# Patient Record
Sex: FEMALE | Race: OTHER | NOT HISPANIC OR LATINO | Employment: FULL TIME | URBAN - METROPOLITAN AREA
[De-identification: names, ages, dates, MRNs, and addresses within clinical notes are randomized per-mention and may not be internally consistent; named-entity substitution may affect disease eponyms.]

---

## 2019-11-11 ENCOUNTER — APPOINTMENT (EMERGENCY)
Dept: CT IMAGING | Facility: HOSPITAL | Age: 44
End: 2019-11-11
Payer: COMMERCIAL

## 2019-11-11 ENCOUNTER — HOSPITAL ENCOUNTER (EMERGENCY)
Facility: HOSPITAL | Age: 44
Discharge: HOME | End: 2019-11-11
Attending: EMERGENCY MEDICINE | Admitting: EMERGENCY MEDICINE
Payer: COMMERCIAL

## 2019-11-11 VITALS
SYSTOLIC BLOOD PRESSURE: 170 MMHG | OXYGEN SATURATION: 99 % | TEMPERATURE: 97.6 F | WEIGHT: 244.93 LBS | RESPIRATION RATE: 20 BRPM | DIASTOLIC BLOOD PRESSURE: 102 MMHG | HEART RATE: 84 BPM

## 2019-11-11 DIAGNOSIS — J34.1 MUCOUS RETENTION CYST OF MAXILLARY SINUS: ICD-10-CM

## 2019-11-11 DIAGNOSIS — R11.0 NAUSEA: Primary | ICD-10-CM

## 2019-11-11 DIAGNOSIS — R51.9 HEADACHE: ICD-10-CM

## 2019-11-11 DIAGNOSIS — R42 LIGHTHEADED: ICD-10-CM

## 2019-11-11 DIAGNOSIS — N89.8 VAGINAL DISCHARGE: ICD-10-CM

## 2019-11-11 LAB
ALBUMIN SERPL BCP-MCNC: 3.7 G/DL (ref 3.5–5)
ALP SERPL-CCNC: 57 U/L (ref 46–116)
ALT SERPL W P-5'-P-CCNC: 46 U/L (ref 12–78)
ANION GAP SERPL CALCULATED.3IONS-SCNC: 9 MMOL/L (ref 4–13)
AST SERPL W P-5'-P-CCNC: 21 U/L (ref 5–45)
BACTERIA UR QL AUTO: ABNORMAL /HPF
BASOPHILS # BLD AUTO: 0.06 THOUSANDS/ΜL (ref 0–0.1)
BASOPHILS NFR BLD AUTO: 1 % (ref 0–1)
BILIRUB SERPL-MCNC: 0.5 MG/DL (ref 0.2–1)
BILIRUB UR QL STRIP: NEGATIVE
BUN SERPL-MCNC: 13 MG/DL (ref 5–25)
CALCIUM SERPL-MCNC: 8.8 MG/DL (ref 8.3–10.1)
CHLORIDE SERPL-SCNC: 105 MMOL/L (ref 100–108)
CLARITY UR: CLEAR
CLARITY, POC: CLEAR
CO2 SERPL-SCNC: 26 MMOL/L (ref 21–32)
COLOR UR: YELLOW
COLOR, POC: YELLOW
CREAT SERPL-MCNC: 1.03 MG/DL (ref 0.6–1.3)
EOSINOPHIL # BLD AUTO: 0.16 THOUSAND/ΜL (ref 0–0.61)
EOSINOPHIL NFR BLD AUTO: 2 % (ref 0–6)
ERYTHROCYTE [DISTWIDTH] IN BLOOD BY AUTOMATED COUNT: 14.7 % (ref 11.6–15.1)
EXT BILIRUBIN, UA: NEGATIVE
EXT BLOOD URINE: ABNORMAL
EXT GLUCOSE, UA: NEGATIVE
EXT KETONES: NEGATIVE
EXT NITRITE, UA: NEGATIVE
EXT PH, UA: 6
EXT PREG TEST URINE: NEGATIVE
EXT PROTEIN, UA: ABNORMAL
EXT SPECIFIC GRAVITY, UA: 1.03
EXT UROBILINOGEN: 0.2
EXT. CONTROL ED NAV: NORMAL
GFR SERPL CREATININE-BSD FRML MDRD: 67 ML/MIN/1.73SQ M
GLUCOSE SERPL-MCNC: 92 MG/DL (ref 65–140)
GLUCOSE UR STRIP-MCNC: NEGATIVE MG/DL
HCT VFR BLD AUTO: 41.1 % (ref 34.8–46.1)
HGB BLD-MCNC: 12.9 G/DL (ref 11.5–15.4)
HGB UR QL STRIP.AUTO: ABNORMAL
HIV 1+2 AB+HIV1 P24 AG SERPL QL IA: NORMAL
HIV1 P24 AG SER QL: NORMAL
IMM GRANULOCYTES # BLD AUTO: 0.02 THOUSAND/UL (ref 0–0.2)
IMM GRANULOCYTES NFR BLD AUTO: 0 % (ref 0–2)
KETONES UR STRIP-MCNC: NEGATIVE MG/DL
LEUKOCYTE ESTERASE UR QL STRIP: NEGATIVE
LYMPHOCYTES # BLD AUTO: 4.5 THOUSANDS/ΜL (ref 0.6–4.47)
LYMPHOCYTES NFR BLD AUTO: 53 % (ref 14–44)
MCH RBC QN AUTO: 28 PG (ref 26.8–34.3)
MCHC RBC AUTO-ENTMCNC: 31.4 G/DL (ref 31.4–37.4)
MCV RBC AUTO: 89 FL (ref 82–98)
MONOCYTES # BLD AUTO: 0.87 THOUSAND/ΜL (ref 0.17–1.22)
MONOCYTES NFR BLD AUTO: 10 % (ref 4–12)
NEUTROPHILS # BLD AUTO: 2.87 THOUSANDS/ΜL (ref 1.85–7.62)
NEUTS SEG NFR BLD AUTO: 34 % (ref 43–75)
NITRITE UR QL STRIP: NEGATIVE
NON-SQ EPI CELLS URNS QL MICRO: ABNORMAL /HPF
NRBC BLD AUTO-RTO: 0 /100 WBCS
PH UR STRIP.AUTO: 5.5 [PH]
PLATELET # BLD AUTO: 279 THOUSANDS/UL (ref 149–390)
PMV BLD AUTO: 9.8 FL (ref 8.9–12.7)
POTASSIUM SERPL-SCNC: 3.4 MMOL/L (ref 3.5–5.3)
PROT SERPL-MCNC: 8.5 G/DL (ref 6.4–8.2)
PROT UR STRIP-MCNC: NEGATIVE MG/DL
RBC # BLD AUTO: 4.61 MILLION/UL (ref 3.81–5.12)
RBC #/AREA URNS AUTO: ABNORMAL /HPF
SODIUM SERPL-SCNC: 140 MMOL/L (ref 136–145)
SP GR UR STRIP.AUTO: >=1.03 (ref 1–1.03)
UROBILINOGEN UR QL STRIP.AUTO: 0.2 E.U./DL
WBC # BLD AUTO: 8.48 THOUSAND/UL (ref 4.31–10.16)
WBC # BLD EST: NEGATIVE 10*3/UL
WBC #/AREA URNS AUTO: ABNORMAL /HPF

## 2019-11-11 PROCEDURE — 86592 SYPHILIS TEST NON-TREP QUAL: CPT | Performed by: PHYSICIAN ASSISTANT

## 2019-11-11 PROCEDURE — 85025 COMPLETE CBC W/AUTO DIFF WBC: CPT | Performed by: PHYSICIAN ASSISTANT

## 2019-11-11 PROCEDURE — 81001 URINALYSIS AUTO W/SCOPE: CPT | Performed by: PHYSICIAN ASSISTANT

## 2019-11-11 PROCEDURE — 99284 EMERGENCY DEPT VISIT MOD MDM: CPT | Performed by: PHYSICIAN ASSISTANT

## 2019-11-11 PROCEDURE — 36415 COLL VENOUS BLD VENIPUNCTURE: CPT | Performed by: PHYSICIAN ASSISTANT

## 2019-11-11 PROCEDURE — 87806 HIV AG W/HIV1&2 ANTB W/OPTIC: CPT | Performed by: PHYSICIAN ASSISTANT

## 2019-11-11 PROCEDURE — 87070 CULTURE OTHR SPECIMN AEROBIC: CPT | Performed by: PHYSICIAN ASSISTANT

## 2019-11-11 PROCEDURE — 96372 THER/PROPH/DIAG INJ SC/IM: CPT

## 2019-11-11 PROCEDURE — 93005 ELECTROCARDIOGRAM TRACING: CPT

## 2019-11-11 PROCEDURE — 80053 COMPREHEN METABOLIC PANEL: CPT | Performed by: PHYSICIAN ASSISTANT

## 2019-11-11 PROCEDURE — 81025 URINE PREGNANCY TEST: CPT | Performed by: PHYSICIAN ASSISTANT

## 2019-11-11 PROCEDURE — 87591 N.GONORRHOEAE DNA AMP PROB: CPT | Performed by: PHYSICIAN ASSISTANT

## 2019-11-11 PROCEDURE — 87491 CHLMYD TRACH DNA AMP PROBE: CPT | Performed by: PHYSICIAN ASSISTANT

## 2019-11-11 PROCEDURE — 70450 CT HEAD/BRAIN W/O DYE: CPT

## 2019-11-11 PROCEDURE — 99284 EMERGENCY DEPT VISIT MOD MDM: CPT

## 2019-11-11 RX ORDER — LISINOPRIL 40 MG/1
40 TABLET ORAL DAILY
COMMUNITY

## 2019-11-11 RX ORDER — HYDROCHLOROTHIAZIDE 12.5 MG/1
10 CAPSULE, GELATIN COATED ORAL DAILY
COMMUNITY

## 2019-11-11 RX ORDER — METRONIDAZOLE 500 MG/1
500 TABLET ORAL EVERY 12 HOURS SCHEDULED
Qty: 14 TABLET | Refills: 0 | Status: SHIPPED | OUTPATIENT
Start: 2019-11-11 | End: 2019-11-18

## 2019-11-11 RX ORDER — AZITHROMYCIN 250 MG/1
1000 TABLET, FILM COATED ORAL ONCE
Status: COMPLETED | OUTPATIENT
Start: 2019-11-11 | End: 2019-11-11

## 2019-11-11 RX ORDER — METRONIDAZOLE 500 MG/1
500 TABLET ORAL ONCE
Status: COMPLETED | OUTPATIENT
Start: 2019-11-11 | End: 2019-11-11

## 2019-11-11 RX ADMIN — LIDOCAINE HYDROCHLORIDE 250 MG: 10 INJECTION, SOLUTION EPIDURAL; INFILTRATION; INTRACAUDAL; PERINEURAL at 19:35

## 2019-11-11 RX ADMIN — METRONIDAZOLE 500 MG: 500 TABLET ORAL at 19:37

## 2019-11-11 RX ADMIN — AZITHROMYCIN 1000 MG: 250 TABLET, FILM COATED ORAL at 19:37

## 2019-11-11 NOTE — ED PROVIDER NOTES
History  Chief Complaint   Patient presents with    Nausea     pt states she has been having nausea/headaches x 2 weeks, dizziness upon standing today  Pt  would also like to have STI screening and pregnancy test done (has been practicing unprotected sex x 2 weeks)     Patient is a 44 y/o F with h/o HTN that presents to the ED with a headache that started 2 weeks ago  SHe states the headache has been coming and going  She has not taken anything for the headache  She states she also has nausea, no vomiting  She denies sinus pressure, but has a little congestion which is normal for her  She states today she felt lightheaded  She denies dizziness, chest pain or SOB  She has been taking her BP meds as directed  She is concerned for STI  She states she has had unprotected sex with 2 different partners in the past 3 weeks  SHe denies abnormal vaginal discharge  She states she had Chlamydia when she was in college and her only symptom was nausea  History provided by:  Patient  Nausea   The primary symptoms include nausea  Primary symptoms do not include fever, abdominal pain, vomiting, diarrhea, dysuria or rash  The illness does not include chills or back pain     Headache   Pain location:  Generalized  Severity currently:  4/10  Onset quality:  Gradual  Duration:  2 weeks  Timing:  Intermittent  Progression:  Unchanged  Chronicity:  New  Similar to prior headaches: yes    Relieved by:  Nothing  Worsened by:  Nothing  Ineffective treatments:  NSAIDs  Associated symptoms: congestion and nausea    Associated symptoms: no abdominal pain, no back pain, no blurred vision, no cough, no diarrhea, no dizziness, no ear pain, no facial pain, no fever, no focal weakness, no loss of balance, no near-syncope, no neck pain, no numbness, no paresthesias, no photophobia, no seizures, no sinus pressure, no sore throat, no syncope, no tingling, no URI, no vomiting and no weakness        Prior to Admission Medications Prescriptions Last Dose Informant Patient Reported? Taking?   hydrochlorothiazide (MICROZIDE) 12 5 mg capsule   Yes Yes   Sig: Take 10 mg by mouth daily   lisinopril (ZESTRIL) 40 mg tablet   Yes Yes   Sig: Take 40 mg by mouth daily      Facility-Administered Medications: None       Past Medical History:   Diagnosis Date    Hypertension        Past Surgical History:   Procedure Laterality Date    TONSILLECTOMY         History reviewed  No pertinent family history  I have reviewed and agree with the history as documented  Social History     Tobacco Use    Smoking status: Never Smoker    Smokeless tobacco: Never Used   Substance Use Topics    Alcohol use: Yes    Drug use: Never        Review of Systems   Constitutional: Negative for chills and fever  HENT: Positive for congestion  Negative for ear pain, facial swelling, sinus pressure, sinus pain and sore throat  Eyes: Negative for blurred vision and photophobia  Respiratory: Negative for cough and shortness of breath  Cardiovascular: Negative for chest pain, leg swelling, syncope and near-syncope  Gastrointestinal: Positive for nausea  Negative for abdominal pain, diarrhea and vomiting  Genitourinary: Negative for dysuria, vaginal bleeding and vaginal discharge  Musculoskeletal: Negative for back pain and neck pain  Skin: Negative for color change and rash  Neurological: Positive for light-headedness and headaches  Negative for dizziness, focal weakness, seizures, syncope, facial asymmetry, speech difficulty, weakness, numbness, paresthesias and loss of balance  Psychiatric/Behavioral: Negative for confusion and decreased concentration  All other systems reviewed and are negative  Physical Exam  Physical Exam   Constitutional: She is oriented to person, place, and time  She appears well-developed and well-nourished  She is cooperative  She does not appear ill  No distress  HENT:   Head: Normocephalic and atraumatic     Right Ear: Hearing and tympanic membrane normal    Left Ear: Hearing and tympanic membrane normal    Nose: Nose normal    Mouth/Throat: Uvula is midline, oropharynx is clear and moist and mucous membranes are normal    Eyes: Pupils are equal, round, and reactive to light  Conjunctivae and EOM are normal    Neck: Normal range of motion  Neck supple  Cardiovascular: Normal rate, regular rhythm and normal heart sounds  No murmur heard  Pulmonary/Chest: Effort normal and breath sounds normal  She has no wheezes  She has no rhonchi  She has no rales  Abdominal: Soft  Normal appearance and bowel sounds are normal  There is no tenderness  Genitourinary: Pelvic exam was performed with patient supine  There is no rash or lesion on the right labia  There is no rash or lesion on the left labia  Cervix exhibits no motion tenderness and no friability  Right adnexum displays no mass  Left adnexum displays no mass  No erythema or bleeding in the vagina  No foreign body in the vagina  Vaginal discharge (thick white discharge) found  Musculoskeletal: Normal range of motion  She exhibits no edema, tenderness or deformity  Neurological: She is alert and oriented to person, place, and time  She has normal strength  No cranial nerve deficit or sensory deficit  Coordination and gait normal  GCS eye subscore is 4  GCS verbal subscore is 5  GCS motor subscore is 6  Skin: Skin is warm and dry  No rash noted  She is not diaphoretic  No pallor  Psychiatric: She has a normal mood and affect  Nursing note and vitals reviewed        Vital Signs  ED Triage Vitals [11/11/19 1722]   Temperature Pulse Respirations Blood Pressure SpO2   97 6 °F (36 4 °C) 95 18 (!) 181/106 96 %      Temp Source Heart Rate Source Patient Position - Orthostatic VS BP Location FiO2 (%)   Temporal Monitor Sitting Right arm --      Pain Score       No Pain           Vitals:    11/11/19 1845 11/11/19 1900 11/11/19 1926 11/11/19 1930   BP: 149/67 155/70 (!) 170/102 (!) 170/102   Pulse: 84 84 79 84   Patient Position - Orthostatic VS: Lying Lying Lying Lying         Visual Acuity      ED Medications  Medications   cefTRIAXone (ROCEPHIN) 250 mg in lidocaine (PF) (XYLOCAINE-MPF) 1 % IM only syringe (250 mg Intramuscular Given 11/11/19 1935)   azithromycin (ZITHROMAX) tablet 1,000 mg (1,000 mg Oral Given 11/11/19 1937)   metroNIDAZOLE (FLAGYL) tablet 500 mg (500 mg Oral Given 11/11/19 1937)       Diagnostic Studies  Results Reviewed     Procedure Component Value Units Date/Time    Genital Comprehensive Culture [[id number]] Collected:  11/11/19 1924    Lab Status:   In process Specimen:  Genital from Cervix Updated:  11/11/19 1948    Urine Microscopic [[id number]]  (Abnormal) Collected:  11/11/19 1802    Lab Status:  Final result Specimen:  Urine, Clean Catch Updated:  11/11/19 1906     RBC, UA 1-2 /hpf      WBC, UA None Seen /hpf      Epithelial Cells Moderate /hpf      Bacteria, UA None Seen /hpf     UA w Reflex to Microscopic w Reflex to Culture [[id number]]  (Abnormal) Collected:  11/11/19 1802    Lab Status:  Final result Specimen:  Urine, Clean Catch Updated:  11/11/19 1848     Color, UA Yellow     Clarity, UA Clear     Specific Gravity, UA >=1 030     pH, UA 5 5     Leukocytes, UA Negative     Nitrite, UA Negative     Protein, UA Negative mg/dl      Glucose, UA Negative mg/dl      Ketones, UA Negative mg/dl      Urobilinogen, UA 0 2 E U /dl      Bilirubin, UA Negative     Blood, UA Small    Comprehensive metabolic panel [[id number]]  (Abnormal) Collected:  11/11/19 1800    Lab Status:  Final result Specimen:  Blood from Arm, Right Updated:  11/11/19 1828     Sodium 140 mmol/L      Potassium 3 4 mmol/L      Chloride 105 mmol/L      CO2 26 mmol/L      ANION GAP 9 mmol/L      BUN 13 mg/dL      Creatinine 1 03 mg/dL      Glucose 92 mg/dL      Calcium 8 8 mg/dL      AST 21 U/L      ALT 46 U/L      Alkaline Phosphatase 57 U/L      Total Protein 8 5 g/dL      Albumin 3 7 g/dL Total Bilirubin 0 50 mg/dL      eGFR 67 ml/min/1 73sq m     Narrative:       [place] guidelines for Chronic Kidney Disease (CKD):     Stage 1 with normal or high GFR (GFR > 90 mL/min/1 73 square meters)    Stage 2 Mild CKD (GFR = 60-89 mL/min/1 73 square meters)    Stage 3A Moderate CKD (GFR = 45-59 mL/min/1 73 square meters)    Stage 3B Moderate CKD (GFR = 30-44 mL/min/1 73 square meters)    Stage 4 Severe CKD (GFR = 15-29 mL/min/1 73 square meters)    Stage 5 End Stage CKD (GFR <15 mL/min/1 73 square meters)  Note: GFR calculation is accurate only with a steady state creatinine    CBC and differential [[id number]]  (Abnormal) Collected:  11/11/19 1800    Lab Status:  Final result Specimen:  Blood from Arm, Right Updated:  11/11/19 1809     WBC 8 48 Thousand/uL      RBC 4 61 Million/uL      Hemoglobin 12 9 g/dL      Hematocrit 41 1 %      MCV 89 fL      MCH 28 0 pg      MCHC 31 4 g/dL      RDW 14 7 %      MPV 9 8 fL      Platelets 936 Thousands/uL      nRBC 0 /100 WBCs      Neutrophils Relative 34 %      Immat GRANS % 0 %      Lymphocytes Relative 53 %      Monocytes Relative 10 %      Eosinophils Relative 2 %      Basophils Relative 1 %      Neutrophils Absolute 2 87 Thousands/µL      Immature Grans Absolute 0 02 Thousand/uL      Lymphocytes Absolute 4 50 Thousands/µL      Monocytes Absolute 0 87 Thousand/µL      Eosinophils Absolute 0 16 Thousand/µL      Basophils Absolute 0 06 Thousands/µL     Rapid HIV 1/2 AB-AG Combo [[id number]] Collected:  11/11/19 1800    Lab Status: In process Specimen:  Blood from Arm, Right Updated:  11/11/19 1806    RPR [[id number]] Collected:  11/11/19 1800    Lab Status: In process Specimen:  Blood from Arm, Right Updated:  11/11/19 1806    Chlamydia/GC amplified DNA by PCR [[id number]] Collected:  11/11/19 1802    Lab Status:   In process Specimen:  Urine, Other Updated:  11/11/19 1806    POCT urinalysis dipstick [[id number]]  (Abnormal) Resulted: 11/11/19 1800    Lab Status:  Final result Specimen:  Urine Updated:  11/11/19 1801     Color, UA yellow     Clarity, UA clear     Glucose, UA (Ref: Negative) negative     Bilirubin, UA (Ref: Negative) negative     Ketones, UA (Ref: Negative) negative     Spec Grav, UA (Ref:1 003-1 030) 1 030     Blood, UA (Ref: Negative) moderate     pH, UA (Ref: 4 5-8 0) 6 0     Protein, UA (Ref: Negative) trace     Urobilinogen, UA (Ref: 0 2- 1 0) 0 2      Leukocytes, UA (Ref: Negative) negative     Nitrite, UA (Ref: Negative) negative    POCT pregnancy, urine [[id number]]  (Normal) Resulted:  11/11/19 1800    Lab Status:  Final result Updated:  11/11/19 1800     EXT PREG TEST UR (Ref: Negative) negative     Control valid                 CT head without contrast   Final Result by Genet King MD (11/11 1847)      No acute intracranial abnormality  Large right maxillary sinus retention cyst             Workstation performed: [id number]                    Procedures  ECG 12 Lead Documentation Only  Date/Time: 11/11/2019 6:49 PM  Performed by: Rishi Jaffe PA-C  Authorized by: Rishi Jaffe PA-C     Indications / Diagnosis:  Lightheaded  Patient location:  ED  Previous ECG:     Previous ECG:  Unavailable  Rate:     ECG rate:  83  Rhythm:     Rhythm: sinus rhythm    Conduction:     Conduction: normal    ST segments:     ST segments:  Normal  T waves:     T waves: normal             ED Course                               MDM  Number of Diagnoses or Management Options  Headache: new and requires workup  Lightheaded: new and requires workup  Mucous retention cyst of maxillary sinus: minor  Nausea: new and requires workup  Vaginal discharge: new and requires workup  Diagnosis management comments: Patient with multiple complaints, will order CT head to r/o brain hemorrhage or tumor  She has nausea and lightheadedness which she has had with prior STI, will order GC/Chlamydia    Patient also requesting testing for HIV   She did have abnormal vaginal discharge on exam, will treat in the ER, culture results pending  Amount and/or Complexity of Data Reviewed  Clinical lab tests: ordered and reviewed  Tests in the radiology section of CPT®: ordered and reviewed    Patient Progress  Patient progress: stable      Disposition  Final diagnoses:   Nausea   Headache   Mucous retention cyst of maxillary sinus   Lightheaded   Vaginal discharge     Time reflects when diagnosis was documented in both MDM as applicable and the Disposition within this note     Time User Action Codes Description Comment    11/11/2019  7:26 PM Loman Ling [R11 0] Nausea     11/11/2019  7:26 PM Redell Kerr Add [R51] Headache     11/11/2019  7:26 PM Redell Kerr Add [J34 1] Mucous retention cyst of maxillary sinus     11/11/2019  7:26 PM Redell Kerr Add [R42] Lightheaded     11/11/2019  7:27 PM Redell Kerr Add [N89 8] Vaginal discharge       ED Disposition     ED Disposition Condition Date/Time Comment    Discharge Stable Mon Nov 11, 2077  3:48 PM Fredo Homes discharge to home/self care  Follow-up Information     Follow up With Specialties Details Why Contact Info    Lavinia Ambrose MD  Call in 2 days As needed, For recheck [address] [facility], [address]            Discharge Medication List as of 11/11/2019  7:30 PM      START taking these medications    Details   metroNIDAZOLE (FLAGYL) 500 mg tablet Take 1 tablet (500 mg total) by mouth every 12 (twelve) hours for 7 days, Starting Mon 11/11/2019, Until Mon 11/18/2019, Normal         CONTINUE these medications which have NOT CHANGED    Details   hydrochlorothiazide (MICROZIDE) 12 5 mg capsule Take 10 mg by mouth daily, Historical Med      lisinopril (ZESTRIL) 40 mg tablet Take 40 mg by mouth daily, Historical Med           No discharge procedures on file      ED Provider  Electronically Signed by           Wyatt Ambrosio PA-C  11/11/19 4057

## 2019-11-12 LAB — RPR SER QL: NORMAL

## 2019-11-12 NOTE — DISCHARGE INSTRUCTIONS
Rest, increase fluids  Tylenol as needed for headaches  Follow up with family doctor if no improvement in 2-3 days  Take flagyl twice a day for next 7 days  Follow up with [facility], nose [facility] concerning retention cyst in maxillary sinus

## 2019-11-13 LAB
ATRIAL RATE: 83 BPM
C TRACH DNA SPEC QL NAA+PROBE: NEGATIVE
N GONORRHOEA DNA SPEC QL NAA+PROBE: NEGATIVE
P AXIS: 71 DEGREES
PR INTERVAL: 158 MS
QRS AXIS: 82 DEGREES
QRSD INTERVAL: 92 MS
QT INTERVAL: 372 MS
QTC INTERVAL: 437 MS
T WAVE AXIS: 43 DEGREES
VENTRICULAR RATE: 83 BPM

## 2019-11-13 PROCEDURE — 93010 ELECTROCARDIOGRAM REPORT: CPT | Performed by: INTERNAL MEDICINE

## 2019-11-14 LAB — BACTERIA GENITAL AEROBE CULT: NORMAL
